# Patient Record
Sex: FEMALE | Race: WHITE | NOT HISPANIC OR LATINO | Employment: OTHER | URBAN - METROPOLITAN AREA
[De-identification: names, ages, dates, MRNs, and addresses within clinical notes are randomized per-mention and may not be internally consistent; named-entity substitution may affect disease eponyms.]

---

## 2019-08-04 ENCOUNTER — OFFICE VISIT (OUTPATIENT)
Dept: URGENT CARE | Facility: CLINIC | Age: 79
End: 2019-08-04
Payer: MEDICARE

## 2019-08-04 ENCOUNTER — APPOINTMENT (OUTPATIENT)
Dept: RADIOLOGY | Facility: CLINIC | Age: 79
End: 2019-08-04
Payer: MEDICARE

## 2019-08-04 VITALS
WEIGHT: 120.4 LBS | OXYGEN SATURATION: 96 % | TEMPERATURE: 97.9 F | SYSTOLIC BLOOD PRESSURE: 192 MMHG | HEART RATE: 71 BPM | DIASTOLIC BLOOD PRESSURE: 100 MMHG | RESPIRATION RATE: 14 BRPM

## 2019-08-04 DIAGNOSIS — S02.5XXA: ICD-10-CM

## 2019-08-04 DIAGNOSIS — S09.93XA FACIAL INJURY, INITIAL ENCOUNTER: Primary | ICD-10-CM

## 2019-08-04 DIAGNOSIS — S01.511A LIP LACERATION, INITIAL ENCOUNTER: ICD-10-CM

## 2019-08-04 PROCEDURE — 70150 X-RAY EXAM OF FACIAL BONES: CPT

## 2019-08-04 PROCEDURE — G0463 HOSPITAL OUTPT CLINIC VISIT: HCPCS | Performed by: PHYSICIAN ASSISTANT

## 2019-08-04 PROCEDURE — 99214 OFFICE O/P EST MOD 30 MIN: CPT | Performed by: PHYSICIAN ASSISTANT

## 2019-08-04 PROCEDURE — 12011 RPR F/E/E/N/L/M 2.5 CM/<: CPT | Performed by: PHYSICIAN ASSISTANT

## 2019-08-04 RX ORDER — PANTOPRAZOLE SODIUM 40 MG/1
TABLET, DELAYED RELEASE ORAL
COMMUNITY
Start: 2019-07-31

## 2019-08-04 RX ORDER — ROSUVASTATIN CALCIUM 5 MG/1
TABLET, COATED ORAL
COMMUNITY
Start: 2019-07-24

## 2019-08-04 RX ORDER — FLUTICASONE PROPIONATE 50 MCG
SPRAY, SUSPENSION (ML) NASAL
COMMUNITY
Start: 2019-07-24

## 2019-08-04 RX ORDER — METOPROLOL SUCCINATE 50 MG/1
TABLET, EXTENDED RELEASE ORAL
COMMUNITY
Start: 2019-06-22

## 2019-08-04 NOTE — PATIENT INSTRUCTIONS
Please follow up with dentist in morning for teeth fracture   please return to clinic in 3-5 days for lip suture removal   please use tramadol as needed for pain   please use gauze on upper front teeth for bleeding   if bleeding becomes excessive please report to ER    Contusion in [facility]:   A contusion is a bruise that appears on your skin after an injury  A bruise happens when small blood vessels tear but skin does not  When blood vessels tear, blood leaks into nearby tissue, such as soft tissue or muscle  DISCHARGE INSTRUCTIONS:   Return to the emergency department if:   · You have new trouble moving the injured area  · You have tingling or numbness in or near the injured area  · Your hand or foot below the bruise gets cold or turns pale  Contact your healthcare provider if:   · You find a new lump in the injured area  · Your symptoms do not improve with treatment after 4 to 5 days  · You have questions or concerns about your condition or care  Medicines: You may need any of the following:  · NSAIDs  help decrease swelling and pain or fever  This medicine is available with or without a doctor's order  NSAIDs can cause stomach bleeding or kidney problems in certain people  If you take blood thinner medicine, always ask your healthcare provider if NSAIDs are safe for you  Always read the medicine label and follow directions  · Prescription pain medicine  may be given  Do not wait until the pain is severe before you take your medicine  · Take your medicine as directed  Contact your healthcare provider if you think your medicine is not helping or if you have side effects  Tell him of her if you are allergic to any medicine  Keep a list of the medicines, vitamins, and herbs you take  Include the amounts, and when and why you take them  Bring the list or the pill bottles to follow-up visits  Carry your medicine list with you in case of an emergency    Follow up with your healthcare provider as directed: You may need to return within a week to check your injury again  Write down your questions so you remember to ask them during your visits  Help a contusion heal:   · Rest the injured area  or use it less than usual  If you bruised your leg or foot, you may need crutches or a cane to help you walk  This will help you keep weight off your injured body part  · Apply ice  to decrease swelling and pain  Ice may also help prevent tissue damage  Use an ice pack, or put crushed ice in a plastic bag  Cover it with a towel and place it on your bruise for 15 to 20 minutes every hour or as directed  · Use compression  to support the area and decrease swelling  Wrap an elastic bandage around the area over the bruised muscle  Make sure the bandage is not too tight  You should be able to fit 1 finger between the bandage and your skin  · Elevate (raise) your injured body part  above the level of your heart to help decrease pain and swelling  Use pillows, blankets, or rolled towels to elevate the area as often as you can  · Do not drink alcohol  as directed  Alcohol may slow healing  · Do not stretch injured muscles  right after your injury  Ask your healthcare provider when and how you may safely stretch after your injury  Gentle stretches can help increase your flexibility  · Do not massage the area or put heating pads  on the bruise right after your injury  Heat and massage may slow healing  Your healthcare provider may tell you to apply heat after several days  At that time, heat will start to help the injury heal   Prevent another contusion:   · Stretch and warm up before you play sports or exercise  · Wear protective gear when you play sports  Examples are shin guards and padding       · If you begin a new physical activity, start slowly to give your body a chance to adjust   © 2017 [employer] Information is for End User's use only and may not be sold, redistributed or otherwise used for commercial purposes  All illustrations and images included in CareNotes® are the copyrighted property of [employer]  or Jorge Patten  The above information is an  only  It is not intended as medical advice for individual conditions or treatments  Talk to your doctor, nurse or pharmacist before following any medical regimen to see if it is safe and effective for you  Laceration   WHAT YOU NEED TO KNOW:   A laceration is an injury to the skin and the soft tissue underneath it  Lacerations happen when you are cut or hit by something  They can happen anywhere on the body  DISCHARGE INSTRUCTIONS:   Return to the emergency department if:   · You have heavy bleeding or bleeding that does not stop after 10 minutes of holding firm, direct pressure over the wound  · Your wound opens up  Contact your healthcare provider if:   · You have a fever or chills  · Your laceration is red, warm, or swollen  · You have red streaks on your skin coming from your wound  · You have white or yellow drainage from the wound that smells bad  · You have pain that gets worse, even after treatment  · You have questions or concerns about your condition or care  Medicines:   · Prescription pain medicine  may be given  Ask how to take this medicine safely  · Antibiotics  help treat or prevent a bacterial infection  · Take your medicine as directed  Contact your healthcare provider if you think your medicine is not helping or if you have side effects  Tell him or her if you are allergic to any medicine  Keep a list of the medicines, vitamins, and herbs you take  Include the amounts, and when and why you take them  Bring the list or the pill bottles to follow-up visits  Carry your medicine list with you in case of an emergency  Care for your wound as directed:   · Do not get your wound wet  until your healthcare provider says it is okay   Do not soak your wound in water  Do not go swimming until your healthcare provider says it is okay  Carefully wash the wound with soap and water  Gently pat the area dry or allow it to air dry  · Change your bandages  when they get wet, dirty, or after washing  Apply new, clean bandages as directed  Do not apply elastic bandages or tape too tight  Do not put powders or lotions over your incision  · Apply antibiotic ointment as directed  Your healthcare provider may give you antibiotic ointment to put over your wound if you have stitches  If you have strips of tape over your incision, let them dry up and fall off on their own  If they do not fall off within 14 days, gently remove them  If you have glue over your wound, do not remove or pick at it  If your glue comes off, do not replace it with glue that you have at home  · Check your wound every day for signs of infection such as swelling, redness, or pus  Self-care:   · Apply ice  on your wound for 15 to 20 minutes every hour or as directed  Use an ice pack, or put crushed ice in a plastic bag  Cover it with a towel  Ice helps prevent tissue damage and decreases swelling and pain  · Use a splint as directed  A splint will decrease movement and stress on your wound  It may help it heal faster  A splint may be used for lacerations over joints or areas of your body that bend  Ask your healthcare provider how to apply and remove a splint  · Decrease scarring of your wound  by applying ointments as directed  Do not apply ointments until your healthcare provider says it is okay  You may need to wait until your wound is healed  Ask which ointment to buy and how often to use it  After your wound is healed, use sunscreen over the area when you are out in the sun  You should do this for at least 6 months to 1 year after your injury  Follow up with your healthcare provider as directed:   You may need to follow up in 24 to 48 hours to have your wound checked for infection  You will need to return in 3 to 14 days if you have stitches or staples so they can be removed  Care for your wound as directed to prevent infection and help it heal  Write down your questions so you remember to ask them during your visits  © 2017 [employer] Information is for End User's use only and may not be sold, redistributed or otherwise used for commercial purposes  All illustrations and images included in CareNotes® are the copyrighted property of [employer]  or Jorge Patten  The above information is an  only  It is not intended as medical advice for individual conditions or treatments  Talk to your doctor, nurse or pharmacist before following any medical regimen to see if it is safe and effective for you

## 2019-08-04 NOTE — PROGRESS NOTES
[facility]        NAME: Rupert Olivier is a 67 y o  female  : 1940    MRN: [id number]  DATE: 2019  TIME: 3:45 PM    Assessment and Plan   Facial injury, initial encounter [S09 93XA]  1  Facial injury, initial encounter  XR facial bones 3+ vw   2  Closed traumatic fracture of tooth, initial encounter     3  Lip laceration, initial encounter       STAT x-ray facial bones shows no acute fracture  She had no more epistaxis after stopping blowing her nose  Patient has tooth fracture as well as chin contusion and lip laceration due to fall  Will give Tramadol due to significant injury for pain  Advised patient to place gauze at root of central incisor to help coagulate bleeding  No significant bleeding upon discharge  Did instruct patient to report to ER if this becomes uncontrolled prior to seeing dentist  She will return to the clinic in 3-5 days for lip suture removal      Patient Instructions     Please follow up with dentist in morning for tooth fracture   please return to clinic in 3-5 days for lip suture removal   please use tramadol as needed for pain   please use gauze on upper front teeth for bleeding   if bleeding becomes excessive please report to ER    Follow up with PCP in 3-5 days  Proceed to  ER if symptoms worsen  Chief Complaint     Chief Complaint   Patient presents with    Fall     Pt fell today on concrete hitting her chin and breaking 2 teeth  History of Present Illness         Patient is a 44-year-old female who comes in today with complaints of fall  Patient states she tripped and fell face first onto concrete  She denies loss of consciousness  She reports fracturing two teeth  Her nose has been bleeding as well but is not painful  She states she struck her chin and this is painful  Patient also reports lip laceration  She denies any other injuries  Review of Systems   Review of Systems   Constitutional: Negative for fever     HENT: Positive for nosebleeds  Lip laceration  Tooth fracture   Respiratory: Negative for shortness of breath  Cardiovascular: Negative for chest pain  Gastrointestinal: Negative for abdominal pain  Musculoskeletal: Negative for arthralgias  Neurological: Negative for dizziness and light-headedness  Current Medications       Current Outpatient Medications:     fluticasone (FLONASE) 50 mcg/act nasal spray, , Disp: , Rfl:     metoprolol succinate (TOPROL-XL) 50 mg 24 hr tablet, , Disp: , Rfl:     pantoprazole (PROTONIX) 40 mg tablet, , Disp: , Rfl:     rosuvastatin (CRESTOR) 5 mg tablet, , Disp: , Rfl:     Current Allergies     Allergies as of 08/04/2019    (No Known Allergies)            The following portions of the patient's history were reviewed and updated as appropriate: allergies, current medications, past family history, past medical history, past social history, past surgical history and problem list      Past Medical History:   Diagnosis Date    High cholesterol     Hypertension        History reviewed  No pertinent surgical history  History reviewed  No pertinent family history  Medications have been verified  Objective   BP (!) 192/100   Pulse 71   Temp 97 9 °F (36 6 °C)   Resp 14   Wt 54 6 kg (120 lb 6 4 oz)   SpO2 96%        Physical Exam     Physical Exam   Constitutional: She is oriented to person, place, and time  She appears well-developed and well-nourished  HENT:   Head: Head is with contusion and with laceration  Head is without raccoon's eyes  Right Ear: Tympanic membrane and ear canal normal    Left Ear: Tympanic membrane and ear canal normal    Nose: No nasal deformity  Epistaxis is observed  Right sinus exhibits no maxillary sinus tenderness and no frontal sinus tenderness  Left sinus exhibits no maxillary sinus tenderness and no frontal sinus tenderness     Mouth/Throat: Uvula is midline, oropharynx is clear and moist and mucous membranes are normal  No posterior bleed noted  Small amount of blood coming from right nostril   Eyes: Pupils are equal, round, and reactive to light  Conjunctivae are normal    Cardiovascular: Regular rhythm and normal heart sounds  Pulmonary/Chest: Effort normal and breath sounds normal    Abdominal: Soft  Neurological: She is alert and oriented to person, place, and time  She has normal strength  No cranial nerve deficit  Laceration repair  Date/Time: 8/4/2019 2:46 PM  Performed by: Margarito Beal PA-C  Authorized by: Margarito Beal PA-C   Consent: Verbal consent obtained    Consent given by: patient  Patient understanding: patient states understanding of the procedure being performed  Body area: mouth  Location details: lower lip, interior  Laceration length: 0 5 cm  Foreign bodies: no foreign bodies    Anesthesia:  Local Anesthetic: lidocaine 2% without epinephrine    Wound Dehiscence:  Superficial Wound Dehiscence: simple closure      Procedure Details:  Irrigation solution: saline  Skin closure: Ethilon  Number of sutures: 3  Technique: simple  Patient tolerance: Patient tolerated the procedure well with no immediate complications

## 2019-08-08 ENCOUNTER — OFFICE VISIT (OUTPATIENT)
Dept: URGENT CARE | Facility: CLINIC | Age: 79
End: 2019-08-08
Payer: MEDICARE

## 2019-08-08 VITALS
OXYGEN SATURATION: 95 % | WEIGHT: 119.2 LBS | BODY MASS INDEX: 24.03 KG/M2 | HEIGHT: 59 IN | SYSTOLIC BLOOD PRESSURE: 178 MMHG | TEMPERATURE: 97.2 F | HEART RATE: 67 BPM | DIASTOLIC BLOOD PRESSURE: 79 MMHG

## 2019-08-08 DIAGNOSIS — Z48.02 ENCOUNTER FOR REMOVAL OF SUTURES: Primary | ICD-10-CM

## 2019-08-08 PROCEDURE — G0463 HOSPITAL OUTPT CLINIC VISIT: HCPCS | Performed by: PHYSICIAN ASSISTANT

## 2019-08-08 PROCEDURE — 99211 OFF/OP EST MAY X REQ PHY/QHP: CPT | Performed by: PHYSICIAN ASSISTANT

## 2019-08-08 NOTE — PROGRESS NOTES
[facility]        NAME: Mallika Sneed is a 67 y o  female  : 1940    MRN: [id number]  DATE: 2019  TIME: 11:15 AM    Assessment and Plan   Encounter for removal of sutures [Z48 02]  1  Encounter for removal of sutures  Suture removal         Patient Instructions   Please keep area clean and dry  Watch for signs of infection    Follow up with PCP in 3-5 days  Proceed to  ER if symptoms worsen  Chief Complaint     Chief Complaint   Patient presents with    Suture / Staple Removal     pt c o coming in for stitch removal after a fall on          History of Present Illness        Patient is a 71-year-old female presents today for suture removal   She was seen by myself 4 days ago for a lip laceration and 3 sutures were placed  She had some facial trauma at that time and has bruising but is feeling much better  She also had dental fractures and had 3 teeth removed status post fall  Review of Systems   Review of Systems   Constitutional: Negative for fever  HENT: Negative for ear pain          +facial bruising  +pulled teeth   Eyes: Negative for redness  Respiratory: Negative for shortness of breath  Cardiovascular: Negative for chest pain  Gastrointestinal: Negative for abdominal distention  Skin: Positive for wound  Negative for rash           Current Medications       Current Outpatient Medications:     fluticasone (FLONASE) 50 mcg/act nasal spray, , Disp: , Rfl:     metoprolol succinate (TOPROL-XL) 50 mg 24 hr tablet, , Disp: , Rfl:     pantoprazole (PROTONIX) 40 mg tablet, , Disp: , Rfl:     rosuvastatin (CRESTOR) 5 mg tablet, , Disp: , Rfl:     Current Allergies     Allergies as of 2019    (No Known Allergies)            The following portions of the patient's history were reviewed and updated as appropriate: allergies, current medications, past family history, past medical history, past social history, past surgical history and problem list      Past Medical History:   Diagnosis Date    High cholesterol     Hypertension        History reviewed  No pertinent surgical history  History reviewed  No pertinent family history  Medications have been verified  Objective   BP (!) 178/79 (BP Location: Right arm, Patient Position: Sitting, Cuff Size: Standard)   Pulse 67   Temp (!) 97 2 °F (36 2 °C)   Ht 4' 11" (1 499 m)   Wt 54 1 kg (119 lb 3 2 oz)   SpO2 95%   BMI 24 08 kg/m²        Physical Exam     Physical Exam   Constitutional: She appears well-developed and well-nourished  HENT:   Head: Head is with contusion  Nose: Nose normal    Mouth/Throat: Oropharynx is clear and moist        Eyes: Pupils are equal, round, and reactive to light  Conjunctivae are normal    Cardiovascular: Normal rate and regular rhythm  Pulmonary/Chest: Effort normal and breath sounds normal    Skin: Skin is warm and dry  Laceration noted  0 5cm lower exterior lip laceration healing well, scab removed from wound with suture removal  No signs of infection      Suture removal  Date/Time: 8/8/2019 10:51 AM  Performed by: Tamiko Serrato PA-C  Authorized by: Tamiko Serrato PA-C     Patient location:  Clinic  Other Assisting Provider: No    Consent:     Consent obtained:  Verbal    Consent given by:  Patient  Location:     Location:  Mouth    Mouth location:  Lower exterior lip  Procedure details: Tools used:  Suture removal kit    Wound appearance:  No sign(s) of infection    Number of sutures removed:  3  Post-procedure details:     Post-removal:  No dressing applied    Patient tolerance of procedure:   Tolerated well, no immediate complications